# Patient Record
Sex: MALE | Race: WHITE
[De-identification: names, ages, dates, MRNs, and addresses within clinical notes are randomized per-mention and may not be internally consistent; named-entity substitution may affect disease eponyms.]

---

## 2018-04-17 ENCOUNTER — HOSPITAL ENCOUNTER (EMERGENCY)
Dept: HOSPITAL 74 - JER | Age: 68
Discharge: HOME | End: 2018-04-17
Payer: COMMERCIAL

## 2018-04-17 VITALS — DIASTOLIC BLOOD PRESSURE: 93 MMHG | SYSTOLIC BLOOD PRESSURE: 146 MMHG | HEART RATE: 101 BPM | TEMPERATURE: 98.1 F

## 2018-04-17 VITALS — BODY MASS INDEX: 30.1 KG/M2

## 2018-04-17 DIAGNOSIS — I10: ICD-10-CM

## 2018-04-17 DIAGNOSIS — H81.399: Primary | ICD-10-CM

## 2018-04-17 LAB
ALBUMIN SERPL-MCNC: 4 G/DL (ref 3.4–5)
ALP SERPL-CCNC: 89 U/L (ref 45–117)
ALT SERPL-CCNC: 19 U/L (ref 12–78)
ANION GAP SERPL CALC-SCNC: 5 MMOL/L (ref 8–16)
AST SERPL-CCNC: 21 U/L (ref 15–37)
BASOPHILS # BLD: 0.7 % (ref 0–2)
BILIRUB SERPL-MCNC: 0.6 MG/DL (ref 0.2–1)
BUN SERPL-MCNC: 13 MG/DL (ref 7–18)
CALCIUM SERPL-MCNC: 8.9 MG/DL (ref 8.5–10.1)
CHLORIDE SERPL-SCNC: 102 MMOL/L (ref 98–107)
CO2 SERPL-SCNC: 30 MMOL/L (ref 21–32)
CREAT SERPL-MCNC: 1.1 MG/DL (ref 0.7–1.3)
DEPRECATED RDW RBC AUTO: 13.6 % (ref 11.9–15.9)
EOSINOPHIL # BLD: 1.6 % (ref 0–4.5)
GLUCOSE SERPL-MCNC: 112 MG/DL (ref 74–106)
HCT VFR BLD CALC: 42.9 % (ref 35.4–49)
HGB BLD-MCNC: 14.8 GM/DL (ref 11.7–16.9)
LYMPHOCYTES # BLD: 19.1 % (ref 8–40)
MCH RBC QN AUTO: 29.6 PG (ref 25.7–33.7)
MCHC RBC AUTO-ENTMCNC: 34.6 G/DL (ref 32–35.9)
MCV RBC: 85.7 FL (ref 80–96)
MONOCYTES # BLD AUTO: 6.3 % (ref 3.8–10.2)
NEUTROPHILS # BLD: 72.3 % (ref 42.8–82.8)
PLATELET # BLD AUTO: 208 K/MM3 (ref 134–434)
PMV BLD: 9.6 FL (ref 7.5–11.1)
POTASSIUM SERPLBLD-SCNC: 3.7 MMOL/L (ref 3.5–5.1)
PROT SERPL-MCNC: 8.4 G/DL (ref 6.4–8.2)
RBC # BLD AUTO: 5.01 M/MM3 (ref 4–5.6)
SODIUM SERPL-SCNC: 137 MMOL/L (ref 136–145)
WBC # BLD AUTO: 7.2 K/MM3 (ref 4–10)

## 2018-04-17 PROCEDURE — 3E033GC INTRODUCTION OF OTHER THERAPEUTIC SUBSTANCE INTO PERIPHERAL VEIN, PERCUTANEOUS APPROACH: ICD-10-PCS | Performed by: EMERGENCY MEDICINE

## 2018-04-17 NOTE — PDOC
History of Present Illness





<Chelsea Padron - Last Filed: 04/17/18 15:42>





- History of Present Illness


Initial Comments: 





04/17/18 13:24


The patient is a 68 year old male with past medical history of hypertension and 

peripheral vertigo who presents to the ED with complaints of nausea and 

dizziness since this morning. The patient states the onset began this morning. 

He reports sudden onset room spinning dizziness that is constant and worse when 

looking down and to the left. He reports his symptoms are similar to his past 

episodes of vertigo. The patient reports taking meclizine today which did not 

help. He denies any recent illness, fevers, chills, vomiting, diarrhea, cough, 

SOB, CP, or urinary symptoms. Denies HA. Denies weakness/numbness in any 

extremity. Denies slurred speech or facial droop.





<Jony Bernal - Last Filed: 04/17/18 16:03>





- General


Chief Complaint: Pain, Acute


Stated Complaint: ABD PAIN, NAUSEA


Time Seen by Provider: 04/17/18 11:30





Past History





<Chelsea Padron - Last Filed: 04/17/18 15:42>





- Past Medical History


Asthma: No


CVA: No


COPD: No


Diabetes: No


HTN: Yes


Hypercholesterolemia: No





- Suicide/Smoking/Psychosocial Hx


Smoking Status: No


Smoking History: Never smoked


Number of Cigarettes Smoked Daily: 0


Hx Alcohol Use: No


Drug/Substance Use Hx: No


Substance Use Type: None





<Jony Bernal - Last Filed: 04/17/18 16:03>





- Past Medical History


Allergies/Adverse Reactions: 


 Allergies











Allergy/AdvReac Type Severity Reaction Status Date / Time


 


No Known Allergies Allergy   Verified 04/17/18 11:22











Home Medications: 


Ambulatory Orders





Amlodipine Besylate [Norvasc] 5 mg PO DAILY 12/11/12 


Meclizine HCl [Antivert] 25 mg PO QID PRN 12/11/12 


Meclizine HCl [Antivert] 50 mg PO TID #90 tablet 12/11/12 











**Review of Systems





- Review of Systems


Comments:: 





04/17/18 13:25


"GENERAL/CONSTITUTIONAL: No fever or chills. No weakness.


HEAD, EYES, EARS, NOSE AND THROAT: No change in vision. No ear pain or 

discharge. No sore throat.


CARDIOVASCULAR: No chest pain or shortness of breath.


RESPIRATORY: No cough, wheezing, or hemoptysis.


GASTROINTESTINAL: No nausea, vomiting, diarrhea or constipation.


GENITOURINARY: No dysuria, frequency, or change in urination.


MUSCULOSKELETAL: No joint or muscle swelling or pain. No neck or back pain.


SKIN: No rash


NEUROLOGIC: + room spinning dizziness, No headache, loss of consciousness, or 

change in strength/sensation.


ENDOCRINE: No increased thirst. No abnormal weight change.


HEMATOLOGIC/LYMPHATIC: No anemia, easy bleeding, or history of blood clots.


ALLERGIC/IMMUNOLOGIC: No hives or skin allergy.


"





<Jony Bernal - Last Filed: 04/17/18 16:03>





*Physical Exam





- Vital Signs


 Last Vital Signs











Temp Pulse Resp BP Pulse Ox


 


 98.1 F   101 H  19   146/93   99 


 


 04/17/18 11:23  04/17/18 11:23  04/17/18 11:23  04/17/18 11:23  04/17/18 11:23














<Chelsea Padron - Last Filed: 04/17/18 15:42>





- Vital Signs


 Last Vital Signs











Temp Pulse Resp BP Pulse Ox


 


 98.1 F   101 H  19   146/93   99 


 


 04/17/18 11:23  04/17/18 11:23  04/17/18 11:23  04/17/18 11:23  04/17/18 11:23














- Physical Exam


Comments: 





04/17/18 13:26


"GENERAL: Awake, alert, and fully oriented, in no acute distress.


HEAD: No signs of trauma


EYES: PERRLA, EOMI, sclera anicteric, conjunctiva clear


ENT: Auricles normal inspection, hearing grossly normal, nares patent, 

oropharynx clear without exudates. Moist mucosa


NECK: Nontender, no stepoffs, Normal ROM, supple, no lymphadenopathy, JVD, or 

masses


LUNGS: Breath sounds equal, clear to auscultation bilaterally.  No wheezes, and 

no crackles


HEART: Regular rate and rhythm, normal S1 and S2, no murmurs, rubs or gallops


ABDOMEN: Soft, nontender, normoactive bowel sounds.  No guarding, no rebound.  

No masses


EXTREMITIES: Normal range of motion, no edema.  No clubbing or cyanosis. No 

cords, erythema, or tenderness


NEUROLOGICAL: Cranial nerves II through XII intact. 5/5 strength and sensation 

in all extremities, Normal speech, normal gait, normal cerebellar function, no 

nystagmus


+ L sided philomena-hallpike 


SKIN: Warm, Dry, normal turgor, no rashes or lesions noted.








<GabeJony - Last Filed: 04/17/18 16:03>





ED Treatment Course





- LABORATORY


CBC & Chemistry Diagram: 


 04/17/18 12:42





 04/17/18 12:42





- ADDITIONAL ORDERS


Additional order review: 


 Laboratory  Results











  04/17/18 04/17/18





  12:42 12:42


 


Sodium  137 


 


Potassium  3.7 


 


Chloride  102 


 


Carbon Dioxide  30 


 


Anion Gap  5 L 


 


BUN  13  D 


 


Creatinine  1.1 


 


Creat Clearance w eGFR  > 60 


 


Random Glucose  112 H D 


 


Calcium  8.9 


 


Total Bilirubin  0.6 


 


AST  21  D 


 


ALT  19 


 


Alkaline Phosphatase  89 


 


Creatine Kinase   314 H


 


Creatine Kinase Index   0.5


 


CK-MB (CK-2)   1.694


 


Troponin I   < 0.02


 


Total Protein  8.4 H 


 


Albumin  4.0 








 











  04/17/18





  12:42


 


RBC  5.01


 


MCV  85.7


 


MCHC  34.6


 


RDW  13.6


 


MPV  9.6


 


Neutrophils %  72.3  D


 


Lymphocytes %  19.1  D


 


Monocytes %  6.3


 


Eosinophils %  1.6


 


Basophils %  0.7














- RADIOLOGY


Radiograph Interpretation: 





04/17/18 15:42


Chest X-ray as reviewed by Dr. Trujillo reports no evidence of acute lung disease. 





- Medications


Given in the ED: 


ED Medications














Discontinued Medications














Generic Name Dose Route Start Last Admin





  Trade Name Freq  PRN Reason Stop Dose Admin


 


Sodium Chloride  1,000 mls @ 1,000 mls/hr  04/17/18 12:12  04/17/18 12:22





  Normal Saline -  IV  04/17/18 13:11  1,000 mls/hr





  ASDIR STA   Administration


 


Meclizine HCl  25 mg  04/17/18 12:13  04/17/18 12:22





  Antivert -  PO  04/17/18 12:14  25 mg





  ONCE ONE   Administration


 


Ondansetron HCl  4 mg  04/17/18 12:12  04/17/18 12:22





  Zofran Injection  IVPB  04/17/18 12:13  4 mg





  ONCE ONE   Administration














<Chelsea Padron - Last Filed: 04/17/18 15:42>





- LABORATORY


CBC & Chemistry Diagram: 


 04/17/18 12:42





 04/17/18 12:42





- ADDITIONAL ORDERS


Additional order review: 


 Laboratory  Results











  04/17/18





  12:42


 


Sodium  137


 


Potassium  3.7


 


Chloride  102


 


Carbon Dioxide  30


 


Anion Gap  5 L


 


BUN  13  D


 


Creatinine  1.1


 


Creat Clearance w eGFR  > 60


 


Random Glucose  112 H D


 


Calcium  8.9


 


Total Bilirubin  0.6


 


AST  21  D


 


ALT  19


 


Alkaline Phosphatase  89


 


Total Protein  8.4 H


 


Albumin  4.0








 











  04/17/18





  12:42


 


RBC  5.01


 


MCV  85.7


 


MCHC  34.6


 


RDW  13.6


 


MPV  9.6


 


Neutrophils %  72.3  D


 


Lymphocytes %  19.1  D


 


Monocytes %  6.3


 


Eosinophils %  1.6


 


Basophils %  0.7














- RADIOLOGY


Radiology Studies Ordered: 














 Category Date Time Status


 


 CHEST PA & LAT [RAD] Stat Radiology  04/17/18 12:05 Ordered














- Medications


Given in the ED: 


ED Medications














Discontinued Medications














Generic Name Dose Route Start Last Admin





  Trade Name Freq  PRN Reason Stop Dose Admin


 


Sodium Chloride  1,000 mls @ 1,000 mls/hr  04/17/18 12:12  04/17/18 12:22





  Normal Saline -  IV  04/17/18 13:11  1,000 mls/hr





  ASDIR STA   Administration


 


Meclizine HCl  25 mg  04/17/18 12:13  04/17/18 12:22





  Antivert -  PO  04/17/18 12:14  25 mg





  ONCE ONE   Administration


 


Ondansetron HCl  4 mg  04/17/18 12:12  04/17/18 12:22





  Zofran Injection  IVPB  04/17/18 12:13  4 mg





  ONCE ONE   Administration














<Jony Bernal - Last Filed: 04/17/18 16:03>





Medical Decision Making





- Medical Decision Making





04/17/18 13:26


68 M with room spinning dizziness, consistent with pt's history of peripheral 

vertigo. Pt with no neuro deficits on exam. However, + philomena hallpike suggestive 

of peripheral vertigo. Pt with no s/s cerebellar/central vertigo.


- Labs


- Meclizine, zofran


- Epley maneuver


- Reassess





04/17/18 16:00


Pt reassessed s/p meclizine, zofran, and epley maneuver.


Pt now completely asymptomatic. Ambulatory with normal gait.


Pt states he has neuro appointment this Friday.


Pt is well appearing, with normal vitals. Clinically stable for DC at this time.


I discussed the physical exam findings, ancillary test results and final 

diagnoses with the patient. I answered all of the patient's questions. The 

patient was satisfied with the care received and felt comfortable with the 

discharge plan and treatment plan.  The patient agrees to follow up with the 

primary care physician within 24-72 hours.








<Jony Bernal - Last Filed: 04/17/18 16:03>





*DC/Admit/Observation/Transfer





- Attestations


Scribe Attestion: 





04/17/18 15:43


Documentation prepared by Chelsea Padron, acting as medical scribe for Jony Bernal MD.





<Chelsea Padron - Last Filed: 04/17/18 15:42>





- Attestations


Physician Attestion: 





04/17/18 16:03








I, Dr. Jony Bernal MD, attest that this document has been prepared under my 

direction and personally reviewed by me in its entirety.   I further attest, 

that it accurately reflects all work, treatment, procedures and medical decision

-making performed by me.  





<Jony Bernal - Last Filed: 04/17/18 16:03>


Diagnosis at time of Disposition: 


 Vertigo








- Discharge Dispostion


Disposition: HOME





- Referrals


Referrals: 


Adriana Larose [Primary Care Provider] - 





- Patient Instructions


Printed Discharge Instructions:  DI for Vertigo


Additional Instructions: 


Follow up with your neurologist as scheduled on Friday for further evaluation 

of your vertigo.


If you experience any worsening dizziness, nausea, headache, weakness or 

numbness, or any other concerning symptoms, return to the ER immediately.





- Post Discharge Activity

## 2018-04-17 NOTE — EKG
Test Reason : 

Blood Pressure : ***/*** mmHG

Vent. Rate : 077 BPM     Atrial Rate : 077 BPM

   P-R Int : 200 ms          QRS Dur : 102 ms

    QT Int : 386 ms       P-R-T Axes : 056 013 047 degrees

   QTc Int : 436 ms

 

NORMAL SINUS RHYTHM

NORMAL ECG

WHEN COMPARED WITH ECG OF 11-DEC-2012 12:39,

NO SIGNIFICANT CHANGE WAS FOUND

Confirmed by MD Mosher Edward ([id number]) on 4/17/2018 4:41:14 PM

 

Referred By:             Confirmed By:Michael Mosher MD

## 2022-12-19 ENCOUNTER — HOSPITAL ENCOUNTER (OUTPATIENT)
Dept: HOSPITAL 74 - JASU-SURG | Age: 72
Discharge: HOME | End: 2022-12-19
Attending: STUDENT IN AN ORGANIZED HEALTH CARE EDUCATION/TRAINING PROGRAM
Payer: COMMERCIAL

## 2022-12-19 VITALS — TEMPERATURE: 97.3 F | RESPIRATION RATE: 20 BRPM

## 2022-12-19 VITALS — BODY MASS INDEX: 29.7 KG/M2

## 2022-12-19 VITALS — SYSTOLIC BLOOD PRESSURE: 134 MMHG | HEART RATE: 70 BPM | DIASTOLIC BLOOD PRESSURE: 72 MMHG

## 2022-12-19 DIAGNOSIS — N40.1: Primary | ICD-10-CM

## 2022-12-19 PROCEDURE — 0VT08ZZ RESECTION OF PROSTATE, VIA NATURAL OR ARTIFICIAL OPENING ENDOSCOPIC: ICD-10-PCS | Performed by: STUDENT IN AN ORGANIZED HEALTH CARE EDUCATION/TRAINING PROGRAM
